# Patient Record
Sex: FEMALE | Race: OTHER | NOT HISPANIC OR LATINO | Employment: FULL TIME | URBAN - METROPOLITAN AREA
[De-identification: names, ages, dates, MRNs, and addresses within clinical notes are randomized per-mention and may not be internally consistent; named-entity substitution may affect disease eponyms.]

---
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
## 2019-05-31 ENCOUNTER — HOSPITAL ENCOUNTER (EMERGENCY)
Facility: HOSPITAL | Age: 19
Discharge: HOME | End: 2019-05-31
Attending: EMERGENCY MEDICINE | Admitting: EMERGENCY MEDICINE
Payer: COMMERCIAL

## 2019-05-31 VITALS
SYSTOLIC BLOOD PRESSURE: 141 MMHG | HEIGHT: 64 IN | BODY MASS INDEX: 26.46 KG/M2 | DIASTOLIC BLOOD PRESSURE: 82 MMHG | HEART RATE: 121 BPM | RESPIRATION RATE: 20 BRPM | OXYGEN SATURATION: 98 % | WEIGHT: 155 LBS | TEMPERATURE: 98 F

## 2019-05-31 DIAGNOSIS — Z34.90 PREGNANCY: Primary | ICD-10-CM

## 2019-05-31 PROCEDURE — 99284 EMERGENCY DEPT VISIT MOD MDM: CPT

## 2019-05-31 PROCEDURE — 99283 EMERGENCY DEPT VISIT LOW MDM: CPT | Performed by: EMERGENCY MEDICINE

## 2020-07-24 ENCOUNTER — APPOINTMENT (EMERGENCY)
Dept: CT IMAGING | Facility: HOSPITAL | Age: 20
End: 2020-07-24
Payer: COMMERCIAL

## 2020-07-24 ENCOUNTER — APPOINTMENT (EMERGENCY)
Dept: RADIOLOGY | Facility: HOSPITAL | Age: 20
End: 2020-07-24
Payer: COMMERCIAL

## 2020-07-24 ENCOUNTER — HOSPITAL ENCOUNTER (EMERGENCY)
Facility: HOSPITAL | Age: 20
Discharge: HOME | End: 2020-07-24
Attending: EMERGENCY MEDICINE | Admitting: EMERGENCY MEDICINE
Payer: COMMERCIAL

## 2020-07-24 VITALS
WEIGHT: 137 LBS | TEMPERATURE: 100.1 F | RESPIRATION RATE: 20 BRPM | DIASTOLIC BLOOD PRESSURE: 60 MMHG | OXYGEN SATURATION: 98 % | HEART RATE: 106 BPM | SYSTOLIC BLOOD PRESSURE: 127 MMHG | BODY MASS INDEX: 23.89 KG/M2

## 2020-07-24 DIAGNOSIS — R11.2 NAUSEA AND VOMITING: ICD-10-CM

## 2020-07-24 DIAGNOSIS — N10 ACUTE PYELONEPHRITIS: Primary | ICD-10-CM

## 2020-07-24 LAB
ALBUMIN SERPL BCP-MCNC: 4.4 G/DL (ref 3.5–5)
ALP SERPL-CCNC: 115 U/L (ref 46–116)
ALT SERPL W P-5'-P-CCNC: 24 U/L (ref 12–78)
ANION GAP SERPL CALCULATED.3IONS-SCNC: 16 MMOL/L (ref 4–13)
AST SERPL W P-5'-P-CCNC: 17 U/L (ref 5–45)
BACTERIA UR QL AUTO: ABNORMAL /HPF
BASOPHILS # BLD AUTO: 0.04 THOUSANDS/ΜL (ref 0–0.1)
BASOPHILS NFR BLD AUTO: 1 % (ref 0–1)
BILIRUB SERPL-MCNC: 0.5 MG/DL (ref 0.2–1)
BILIRUB UR QL STRIP: NEGATIVE
BUN SERPL-MCNC: 10 MG/DL (ref 5–25)
CALCIUM SERPL-MCNC: 9.3 MG/DL (ref 8.3–10.1)
CHLORIDE SERPL-SCNC: 103 MMOL/L (ref 100–108)
CLARITY UR: CLEAR
CO2 SERPL-SCNC: 21 MMOL/L (ref 21–32)
COLOR UR: YELLOW
CREAT SERPL-MCNC: 0.81 MG/DL (ref 0.6–1.3)
EOSINOPHIL # BLD AUTO: 0.01 THOUSAND/ΜL (ref 0–0.61)
EOSINOPHIL NFR BLD AUTO: 0 % (ref 0–6)
ERYTHROCYTE [DISTWIDTH] IN BLOOD BY AUTOMATED COUNT: 15.8 % (ref 11.6–15.1)
EXT PREG TEST URINE: NEGATIVE
EXT. CONTROL ED NAV: NORMAL
GFR SERPL CREATININE-BSD FRML MDRD: 104 ML/MIN/1.73SQ M
GLUCOSE SERPL-MCNC: 81 MG/DL (ref 65–140)
GLUCOSE UR STRIP-MCNC: NEGATIVE MG/DL
HCT VFR BLD AUTO: 39.3 % (ref 34.8–46.1)
HGB BLD-MCNC: 12.3 G/DL (ref 11.5–15.4)
HGB UR QL STRIP.AUTO: ABNORMAL
IMM GRANULOCYTES # BLD AUTO: 0.02 THOUSAND/UL (ref 0–0.2)
IMM GRANULOCYTES NFR BLD AUTO: 0 % (ref 0–2)
KETONES UR STRIP-MCNC: ABNORMAL MG/DL
LACTATE SERPL-SCNC: 1 MMOL/L (ref 0.5–2)
LEUKOCYTE ESTERASE UR QL STRIP: NEGATIVE
LYMPHOCYTES # BLD AUTO: 1.26 THOUSANDS/ΜL (ref 0.6–4.47)
LYMPHOCYTES NFR BLD AUTO: 15 % (ref 14–44)
MCH RBC QN AUTO: 24.2 PG (ref 26.8–34.3)
MCHC RBC AUTO-ENTMCNC: 31.3 G/DL (ref 31.4–37.4)
MCV RBC AUTO: 77 FL (ref 82–98)
MONOCYTES # BLD AUTO: 0.6 THOUSAND/ΜL (ref 0.17–1.22)
MONOCYTES NFR BLD AUTO: 7 % (ref 4–12)
MUCOUS THREADS UR QL AUTO: ABNORMAL
NEUTROPHILS # BLD AUTO: 6.67 THOUSANDS/ΜL (ref 1.85–7.62)
NEUTS SEG NFR BLD AUTO: 77 % (ref 43–75)
NITRITE UR QL STRIP: NEGATIVE
NON-SQ EPI CELLS URNS QL MICRO: ABNORMAL /HPF
NRBC BLD AUTO-RTO: 0 /100 WBCS
PH UR STRIP.AUTO: 6 [PH]
PLATELET # BLD AUTO: 307 THOUSANDS/UL (ref 149–390)
PMV BLD AUTO: 10.9 FL (ref 8.9–12.7)
POTASSIUM SERPL-SCNC: 3.7 MMOL/L (ref 3.5–5.3)
PROT SERPL-MCNC: 8.5 G/DL (ref 6.4–8.2)
PROT UR STRIP-MCNC: NEGATIVE MG/DL
RBC # BLD AUTO: 5.09 MILLION/UL (ref 3.81–5.12)
RBC #/AREA URNS AUTO: ABNORMAL /HPF
SARS-COV-2 RNA RESP QL NAA+PROBE: NEGATIVE
SODIUM SERPL-SCNC: 140 MMOL/L (ref 136–145)
SP GR UR STRIP.AUTO: 1.02 (ref 1–1.03)
UROBILINOGEN UR QL STRIP.AUTO: 0.2 E.U./DL
WBC # BLD AUTO: 8.6 THOUSAND/UL (ref 4.31–10.16)
WBC #/AREA URNS AUTO: ABNORMAL /HPF

## 2020-07-24 PROCEDURE — 85025 COMPLETE CBC W/AUTO DIFF WBC: CPT | Performed by: PHYSICIAN ASSISTANT

## 2020-07-24 PROCEDURE — 99284 EMERGENCY DEPT VISIT MOD MDM: CPT

## 2020-07-24 PROCEDURE — 99285 EMERGENCY DEPT VISIT HI MDM: CPT | Performed by: PHYSICIAN ASSISTANT

## 2020-07-24 PROCEDURE — 83605 ASSAY OF LACTIC ACID: CPT | Performed by: PHYSICIAN ASSISTANT

## 2020-07-24 PROCEDURE — 36415 COLL VENOUS BLD VENIPUNCTURE: CPT | Performed by: PHYSICIAN ASSISTANT

## 2020-07-24 PROCEDURE — 81025 URINE PREGNANCY TEST: CPT | Performed by: PHYSICIAN ASSISTANT

## 2020-07-24 PROCEDURE — 96374 THER/PROPH/DIAG INJ IV PUSH: CPT

## 2020-07-24 PROCEDURE — 87077 CULTURE AEROBIC IDENTIFY: CPT | Performed by: PHYSICIAN ASSISTANT

## 2020-07-24 PROCEDURE — 87040 BLOOD CULTURE FOR BACTERIA: CPT | Performed by: PHYSICIAN ASSISTANT

## 2020-07-24 PROCEDURE — 96361 HYDRATE IV INFUSION ADD-ON: CPT

## 2020-07-24 PROCEDURE — 81001 URINALYSIS AUTO W/SCOPE: CPT | Performed by: PHYSICIAN ASSISTANT

## 2020-07-24 PROCEDURE — 87186 SC STD MICRODIL/AGAR DIL: CPT | Performed by: PHYSICIAN ASSISTANT

## 2020-07-24 PROCEDURE — 87635 SARS-COV-2 COVID-19 AMP PRB: CPT | Performed by: PHYSICIAN ASSISTANT

## 2020-07-24 PROCEDURE — 96375 TX/PRO/DX INJ NEW DRUG ADDON: CPT

## 2020-07-24 PROCEDURE — 80053 COMPREHEN METABOLIC PANEL: CPT | Performed by: PHYSICIAN ASSISTANT

## 2020-07-24 PROCEDURE — 71045 X-RAY EXAM CHEST 1 VIEW: CPT

## 2020-07-24 PROCEDURE — 87086 URINE CULTURE/COLONY COUNT: CPT | Performed by: PHYSICIAN ASSISTANT

## 2020-07-24 PROCEDURE — 74177 CT ABD & PELVIS W/CONTRAST: CPT

## 2020-07-24 RX ORDER — KETOROLAC TROMETHAMINE 30 MG/ML
30 INJECTION, SOLUTION INTRAMUSCULAR; INTRAVENOUS ONCE
Status: COMPLETED | OUTPATIENT
Start: 2020-07-24 | End: 2020-07-24

## 2020-07-24 RX ORDER — ONDANSETRON 4 MG/1
4 TABLET, FILM COATED ORAL EVERY 6 HOURS PRN
Qty: 20 TABLET | Refills: 0 | Status: SHIPPED | OUTPATIENT
Start: 2020-07-24 | End: 2020-07-29

## 2020-07-24 RX ORDER — DICYCLOMINE HCL 20 MG
20 TABLET ORAL ONCE
Status: COMPLETED | OUTPATIENT
Start: 2020-07-24 | End: 2020-07-24

## 2020-07-24 RX ORDER — ONDANSETRON 2 MG/ML
4 INJECTION INTRAMUSCULAR; INTRAVENOUS ONCE
Status: COMPLETED | OUTPATIENT
Start: 2020-07-24 | End: 2020-07-24

## 2020-07-24 RX ORDER — CEPHALEXIN 250 MG/1
500 CAPSULE ORAL ONCE
Status: COMPLETED | OUTPATIENT
Start: 2020-07-24 | End: 2020-07-24

## 2020-07-24 RX ORDER — CEPHALEXIN 500 MG/1
500 CAPSULE ORAL EVERY 6 HOURS SCHEDULED
Qty: 40 CAPSULE | Refills: 0 | Status: SHIPPED | OUTPATIENT
Start: 2020-07-24 | End: 2020-08-03

## 2020-07-24 RX ADMIN — ONDANSETRON 4 MG: 2 INJECTION INTRAMUSCULAR; INTRAVENOUS at 01:55

## 2020-07-24 RX ADMIN — DICYCLOMINE HYDROCHLORIDE 20 MG: 20 TABLET ORAL at 01:35

## 2020-07-24 RX ADMIN — SODIUM CHLORIDE 1000 ML: 0.9 INJECTION, SOLUTION INTRAVENOUS at 01:53

## 2020-07-24 RX ADMIN — KETOROLAC TROMETHAMINE 30 MG: 30 INJECTION, SOLUTION INTRAMUSCULAR at 01:55

## 2020-07-24 RX ADMIN — IOHEXOL 100 ML: 350 INJECTION, SOLUTION INTRAVENOUS at 02:36

## 2020-07-24 RX ADMIN — CEPHALEXIN 500 MG: 250 CAPSULE ORAL at 03:29

## 2020-07-24 NOTE — ED PROVIDER NOTES
History  Chief Complaint   Patient presents with    Abdominal Pain     abdominal pain, stiff neck, chills, back pain, 99 9 temp at home, headache, multiple complaints, generalized body aches all starting this morning, hx of kidney stones     25-year-old female with past medical history significant for pyelonephritis and STD infection who presents to the emergency department for multiple complaints including lower abdominal pain, back pain, myalgias, fever and chills, headache, and increased weakness and fatigue beginning this morning  Patient states symptoms feel similar to when she had a kidney infection  She reports she was recently treated for UTI 2 weeks ago, denies any urinary symptoms currently  No hx of kidney stones  On review of records, patient was treated for BV with Flagyl  She denies any further abnormal vaginal discharge  She denies any contact with known positive COVID patients, 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  Taking?   acetaminophen (TYLENOL) 325 mg tablet  Self No No   Sig: Take 2 tablets by mouth every 6 (six) hours as needed for mild pain, headaches or fever   ketorolac (TORADOL) 10 mg tablet   No No   Sig: Take 1 tablet (10 mg total) by mouth every 6 (six) hours as needed for moderate pain for up to 5 days   ondansetron (ZOFRAN-ODT) 4 mg disintegrating tablet   No No   Sig: Take 1 tablet by mouth every 8 (eight) hours as needed for nausea or vomiting for up to 3 days   oxyCODONE-acetaminophen (PERCOCET) 5-325 mg per tablet   No No   Sig: Take 1 tablet by mouth every 4 (four) hours as needed for moderate pain Max Daily Amount: 6 tablets   oxyCODONE-acetaminophen (PERCOCET) 5-325 mg per tablet   No No   Si-2 tablet by mouth every 4-6 hours as needed for pain   sucralfate (CARAFATE) 1 g tablet   No No   Sig: Take 1 tablet by mouth 4 (four) times a day (before meals and at bedtime) for 30 days      Facility-Administered Medications: None       Past Medical History:   Diagnosis Date    Chlamydia 2017    Pyelonephritis     Sepsis due to urinary tract infection ([facility] [address] ) 2017       Past Surgical History:   Procedure Laterality Date    INDUCED          Family History   Family history unknown: Yes     I have reviewed and agree with the history as documented  E-Cigarette/Vaping     E-Cigarette/Vaping Substances     Social History     Tobacco Use    Smoking status: Never Smoker    Smokeless tobacco: Never Used   Substance Use Topics    Alcohol use: No    Drug use: No       Review of Systems   Constitutional: Positive for chills, fatigue and fever  Respiratory: Negative for cough, chest tightness and shortness of breath  Cardiovascular: Negative for chest pain and palpitations  Gastrointestinal: Positive for abdominal pain, diarrhea, nausea and vomiting  Negative for abdominal distention, blood in stool and constipation  Genitourinary: Negative for decreased urine volume, difficulty urinating, dysuria, flank pain, frequency, hematuria, urgency, vaginal bleeding and vaginal discharge  Musculoskeletal: Positive for back pain and myalgias  Negative for neck pain and neck stiffness  Skin: Negative for color change and rash  Neurological: Positive for weakness and headaches  Negative for dizziness and light-headedness  Hematological: Negative for adenopathy  All other systems reviewed and are negative  Physical Exam  Physical Exam   Constitutional: She is oriented to person, place, and time  She appears well-developed and well-nourished  She is cooperative  Non-toxic appearance  No distress  HENT:   Head: Normocephalic and atraumatic     Mouth/Throat: Uvula is midline, oropharynx is clear and moist and mucous membranes are normal    Eyes: Pupils are equal, round, and reactive to light  Conjunctivae and EOM are normal    Neck: Normal range of motion  Neck supple  Cardiovascular: Regular rhythm, intact distal pulses and normal pulses  Tachycardia present  Pulmonary/Chest: Effort normal and breath sounds normal    Abdominal: Soft  Normal appearance and bowel sounds are normal  She exhibits no distension and no mass  There is no hepatosplenomegaly  There is tenderness in the right lower quadrant, suprapubic area and left lower quadrant  There is CVA tenderness (left)  There is no rigidity, no rebound and no guarding  No hernia  Musculoskeletal: Normal range of motion  Lymphadenopathy:     She has no cervical adenopathy  Neurological: She is alert and oriented to person, place, and time  Skin: Skin is warm  Capillary refill takes less than 2 seconds  No lesion and no rash noted  No erythema  Vitals reviewed        Vital Signs  ED Triage Vitals [07/24/20 0109]   Temperature Pulse Respirations Blood Pressure SpO2   100 1 °F (37 8 °C) (!) 114 22 126/60 100 %      Temp Source Heart Rate Source Patient Position - Orthostatic VS BP Location FiO2 (%)   Oral Monitor Sitting Right arm --      Pain Score       5           Vitals:    07/24/20 0145 07/24/20 0230 07/24/20 0300 07/24/20 0330   BP: 126/82 122/55 119/63 127/60   Pulse: (!) 119 102 102 (!) 106   Patient Position - Orthostatic VS: Sitting Sitting Sitting Sitting         Visual Acuity      ED Medications  Medications   sodium chloride 0 9 % bolus 1,000 mL (0 mL Intravenous Stopped 7/24/20 0326)   ketorolac (TORADOL) injection 30 mg (30 mg Intravenous Given 7/24/20 0155)   ondansetron (ZOFRAN) injection 4 mg (4 mg Intravenous Given 7/24/20 0155)   dicyclomine (BENTYL) tablet 20 mg (20 mg Oral Given 7/24/20 0135)   iohexol (OMNIPAQUE) 350 MG/ML injection (MULTI-DOSE) 100 mL (100 mL Intravenous Given 7/24/20 0236)   cephalexin (KEFLEX) capsule 500 mg (500 mg Oral Given 7/24/20 0329)       Diagnostic Studies  Results Reviewed     Procedure Component Value Units Date/Time    Novel Coronavirus Zack [facility] [[id number]]  (Normal) Collected:  07/24/20 0137    Lab Status:  Final result Specimen:  Nares from Nasopharyngeal Swab Updated:  07/24/20 0234     SARS-CoV-2 Negative    Narrative: The specimen collection materials, transport medium, and/or testing methodology utilized in the production of these test results have been proven to be reliable in a limited validation with an abbreviated program under the Emergency Utilization Authorization provided by the FDA  Testing reported as "Presumptive positive" will be confirmed with secondary testing with a reference laboratory to ensure result accuracy  Clinical caution and judgement should be used with the interpretation of these results with consideration of the clinical impression and other laboratory testing  Testing reported as "Positive" or "Negative" has been proven to be accurate according to standard laboratory validation requirements  All testing is performed with control materials showing appropriate reactivity at standard intervals  Blood culture #1 [[id number]] Collected:  07/24/20 0226    Lab Status: In process Specimen:  Blood from Hand, Right Updated:  07/24/20 0228    Lactic acid [[id number]]  (Normal) Collected:  07/24/20 0153    Lab Status:  Final result Specimen:  Blood from Arm, Right Updated:  07/24/20 0224     LACTIC ACID 1 0 mmol/L     Narrative:       Result may be elevated if tourniquet was used during collection      Comprehensive metabolic panel [[id number]]  (Abnormal) Collected:  07/24/20 0153    Lab Status:  Final result Specimen:  Blood from Arm, Right Updated:  07/24/20 0221     Sodium 140 mmol/L      Potassium 3 7 mmol/L      Chloride 103 mmol/L      CO2 21 mmol/L      ANION GAP 16 mmol/L      BUN 10 mg/dL      Creatinine 0 81 mg/dL      Glucose 81 mg/dL      Calcium 9 3 mg/dL      AST 17 U/L      ALT 24 U/L      Alkaline Phosphatase 115 U/L      Total Protein 8 5 g/dL      Albumin 4 4 g/dL      Total Bilirubin 0 50 mg/dL      eGFR 104 ml/min/1 73sq m     Narrative:       [place] guidelines for Chronic Kidney Disease (CKD):     Stage 1 with normal or high GFR (GFR > 90 mL/min/1 73 square meters)    Stage 2 Mild CKD (GFR = 60-89 mL/min/1 73 square meters)    Stage 3A Moderate CKD (GFR = 45-59 mL/min/1 73 square meters)    Stage 3B Moderate CKD (GFR = 30-44 mL/min/1 73 square meters)    Stage 4 Severe CKD (GFR = 15-29 mL/min/1 73 square meters)    Stage 5 End Stage CKD (GFR <15 mL/min/1 73 square meters)  Note: GFR calculation is accurate only with a steady state creatinine    CBC and differential [[id number]]  (Abnormal) Collected:  07/24/20 0153    Lab Status:  Final result Specimen:  Blood from Arm, Right Updated:  07/24/20 0203     WBC 8 60 Thousand/uL      RBC 5 09 Million/uL      Hemoglobin 12 3 g/dL      Hematocrit 39 3 %      MCV 77 fL      MCH 24 2 pg      MCHC 31 3 g/dL      RDW 15 8 %      MPV 10 9 fL      Platelets 195 Thousands/uL      nRBC 0 /100 WBCs      Neutrophils Relative 77 %      Immat GRANS % 0 %      Lymphocytes Relative 15 %      Monocytes Relative 7 %      Eosinophils Relative 0 %      Basophils Relative 1 %      Neutrophils Absolute 6 67 Thousands/µL      Immature Grans Absolute 0 02 Thousand/uL      Lymphocytes Absolute 1 26 Thousands/µL      Monocytes Absolute 0 60 Thousand/µL      Eosinophils Absolute 0 01 Thousand/µL      Basophils Absolute 0 04 Thousands/µL     Blood culture #2 [[id number]] Collected:  07/24/20 0153    Lab Status:   In process Specimen:  Blood from Arm, Right Updated:  07/24/20 0200    Urine Microscopic [[id number]]  (Abnormal) Collected:  07/24/20 0138    Lab Status:  Final result Specimen:  Urine, Clean Catch Updated:  07/24/20 0154     RBC, UA 1-2 /hpf      WBC, UA 2-4 /hpf      Epithelial Cells Occasional /hpf      Bacteria, UA Occasional /hpf      MUCUS THREADS Occasional     URINE COMMENT --    POCT pregnancy, urine [[id number]]  (Normal) Resulted:  07/24/20 0149    Lab Status:  Final result Updated:  07/24/20 0149     EXT PREG TEST UR (Ref: Negative) negative     Control valid    UA w Reflex to Microscopic w Reflex to Culture [[id number]]  (Abnormal) Collected:  07/24/20 0138    Lab Status:  Final result Specimen:  Urine, Clean Catch Updated:  07/24/20 0146     Color, UA Yellow     Clarity, UA Clear     Specific [place], UA 1 020     pH, UA 6 0     Leukocytes, UA Negative     Nitrite, UA Negative     Protein, UA Negative mg/dl      Glucose, UA Negative mg/dl      Ketones, UA >=80 (3+) mg/dl      Urobilinogen, UA 0 2 E U /dl      Bilirubin, UA Negative     Blood, UA Trace-Intact     URINE COMMENT --    Urine culture [[id number]] Collected:  07/24/20 0138    Lab Status: In process Specimen:  Urine, Clean Catch Updated:  07/24/20 0146                 CT abdomen pelvis with contrast   Final Result by Ivonne Tipton MD (07/24 0309)      Left sided striated nephrogram compatible with pyelonephritis  No hydronephrosis  Bladder wall thickening is seen compatible with cystitis                  Workstation performed: [id number]         XR chest 1 view portable    (Results Pending)              Procedures  Procedures         ED Course  ED Course as of Jul 24 0344 Fri Jul 24, 2020   0325 CT showing evidence of pyelonephritis, consistent with exam and scope of symptoms  Will treat with Keflex  On reassessment, patient states she feels improved, no further nausea, no vomiting since ED arrival, pain improved, able to tolerate oral intake  She is a good candidate for outpatient antibiotic therapy  We discussed that if she is not feeling better by day 3 of antibiotics or if there is any sudden worsening between today and day 3 of antibiotic therapy, she should return to the ER for further evaluation  Tachycardia resolved, heart rate improved to the high 80s low 90s  US AUDIT      Most Recent Value   Initial Alcohol Screen: US AUDIT-C    1  How often do you have a drink containing alcohol?  0 Filed at: 07/24/2020 0108   2  How many drinks containing alcohol do you have on a typical day you are drinking? 0 Filed at: 07/24/2020 0108   3a  Male UNDER 65: How often do you have five or more drinks on one occasion? 0 Filed at: 07/24/2020 0108   3b  FEMALE Any Age, or MALE 65+: How often do you have 4 or more drinks on one occassion? 0 Filed at: 07/24/2020 0108   Audit-C Score  0 Filed at: 07/24/2020 0108                  MARCE/DAST-10      Most Recent Value   How many times in the past year have you    Used an illegal drug or used a prescription medication for non-medical reasons? Never Filed at: 07/24/2020 0108                                MDM  Number of Diagnoses or Management Options  Acute pyelonephritis:   Nausea and vomiting:   Diagnosis management comments: Patient to be evaluated for recurrent UTI, pyelonephritis, nephrolithiasis, COVID-19 infection or other viral syndrome    Tachycardic with elevated temperature, BP on the lower end but not hypotensive, otherwise looks well, no distress    Will check CBC for leukocytosis and bandemia, lactic and blood cultures, CMP for any ANA and transaminitis, UA, covid test, CT abd and pelvis    Will start fluids to hydrate, Toradol and dicyclomine for pain, Zofran for nausea       Amount and/or Complexity of Data Reviewed  Clinical lab tests: ordered and reviewed  Tests in the radiology section of CPT®: ordered and reviewed  Discussion of test results with the performing providers: yes  Decide to obtain previous medical records or to obtain history from someone other than the patient: yes  Obtain history from someone other than the patient: yes  Review and summarize past medical records: yes  Discuss the patient with other providers: yes  Independent visualization of images, tracings, or specimens: yes    Risk of Complications, Morbidity, and/or Mortality  General comments: See ED course note for dispo and plan  I reviewed and discussed all lab and imaging findings with the patient at bedside  I discussed emergency department return parameters  I answered any and all questions the patient had regarding emergency department course of evaluation and treatment  The patient verbalized understanding of and agreement with plan  Patient Progress  Patient progress: improved        Disposition  Final diagnoses:   Acute pyelonephritis   Nausea and vomiting     Time reflects when diagnosis was documented in both MDM as applicable and the Disposition within this note     Time User Action Codes Description Comment    7/24/2020  3:28 AM Milagros Cones Add [N10] Acute pyelonephritis     7/24/2020  3:28 AM Milagros Cones Add [R11 2] Nausea and vomiting       ED Disposition     ED Disposition Condition Date/Time Comment    Discharge Stable Fri Jul 24, 2020  3:27 AM Usha Jimenez discharge to home/self care              Follow-up Information     Follow up With Specialties Details Why Contact Info Additional Information    Wolfgang Tabor MD Family Medicine Schedule an appointment as soon as possible for a visit in 3 days For further evaluation [address] [address]  [address] [place] [address]  Emergency Department Emergency Medicine Go to  If symptoms worsen [address] [facility] [address] [phone] [place] ED, [address] [place] [address], [id number]          Discharge Medication List as of 7/24/2020  3:29 AM      START taking these medications    Details   cephalexin (KEFLEX) 500 mg capsule Take 1 capsule (500 mg total) by mouth every 6 (six) hours for 10 days, Starting Fri 7/24/2020, Until Mon 8/3/2020, Normal         CONTINUE these medications which have NOT CHANGED    Details   acetaminophen (TYLENOL) 325 mg tablet Take 2 tablets by mouth every 6 (six) hours as needed for mild pain, headaches or fever, Starting Thu 9/21/2017, No Print      ketorolac (TORADOL) 10 mg tablet Take 1 tablet (10 mg total) by mouth every 6 (six) hours as needed for moderate pain for up to 5 days, Starting Thu 4/19/2018, Until Tue 4/24/2018, Normal      ondansetron (ZOFRAN-ODT) 4 mg disintegrating tablet Take 1 tablet by mouth every 8 (eight) hours as needed for nausea or vomiting for up to 3 days, Starting Thu 9/21/2017, Until Sun 9/24/2017, Print      !! oxyCODONE-acetaminophen (PERCOCET) 5-325 mg per tablet Take 1 tablet by mouth every 4 (four) hours as needed for moderate pain Max Daily Amount: 6 tablets, Starting Wed 4/11/2018, Normal      !! oxyCODONE-acetaminophen (PERCOCET) 5-325 mg per tablet 1-2 tablet by mouth every 4-6 hours as needed for pain, Print      sucralfate (CARAFATE) 1 g tablet Take 1 tablet by mouth 4 (four) times a day (before meals and at bedtime) for 30 days, Starting u 9/21/2017, Until Sat 10/21/2017, Print       !! - Potential duplicate medications found  Please discuss with provider  No discharge procedures on file      PDMP Review     None          ED Provider  Electronically Signed by           Kal Watkins PA-C  07/24/20 6289

## 2020-07-26 LAB — BACTERIA UR CULT: ABNORMAL

## 2020-07-29 LAB
BACTERIA BLD CULT: NORMAL
BACTERIA BLD CULT: NORMAL